# Patient Record
Sex: FEMALE | Race: WHITE | NOT HISPANIC OR LATINO | Employment: UNEMPLOYED | URBAN - METROPOLITAN AREA
[De-identification: names, ages, dates, MRNs, and addresses within clinical notes are randomized per-mention and may not be internally consistent; named-entity substitution may affect disease eponyms.]

---

## 2017-02-09 ENCOUNTER — GENERIC CONVERSION - ENCOUNTER (OUTPATIENT)
Dept: OTHER | Facility: OTHER | Age: 27
End: 2017-02-09

## 2017-02-09 ENCOUNTER — ALLSCRIPTS OFFICE VISIT (OUTPATIENT)
Dept: OTHER | Facility: OTHER | Age: 27
End: 2017-02-09

## 2017-02-09 DIAGNOSIS — M53.3 SACROCOCCYGEAL DISORDERS, NOT ELSEWHERE CLASSIFIED: ICD-10-CM

## 2017-02-10 ENCOUNTER — HOSPITAL ENCOUNTER (OUTPATIENT)
Dept: RADIOLOGY | Facility: HOSPITAL | Age: 27
Discharge: HOME | End: 2017-02-10
Payer: COMMERCIAL

## 2017-02-10 ENCOUNTER — TRANSCRIBE ORDERS (OUTPATIENT)
Dept: ADMINISTRATIVE | Facility: HOSPITAL | Age: 27
End: 2017-02-10

## 2017-02-10 DIAGNOSIS — M53.3 SACROCOCCYGEAL DISORDERS, NOT ELSEWHERE CLASSIFIED: ICD-10-CM

## 2017-02-10 PROCEDURE — 72220 X-RAY EXAM SACRUM TAILBONE: CPT

## 2017-02-10 PROCEDURE — 72100 X-RAY EXAM L-S SPINE 2/3 VWS: CPT

## 2017-02-13 ENCOUNTER — GENERIC CONVERSION - ENCOUNTER (OUTPATIENT)
Dept: OTHER | Facility: OTHER | Age: 27
End: 2017-02-13

## 2017-03-17 ENCOUNTER — HOSPITAL ENCOUNTER (OUTPATIENT)
Dept: RADIOLOGY | Facility: CLINIC | Age: 27
Discharge: HOME | End: 2017-03-17
Payer: COMMERCIAL

## 2017-03-17 ENCOUNTER — ALLSCRIPTS OFFICE VISIT (OUTPATIENT)
Dept: OTHER | Facility: OTHER | Age: 27
End: 2017-03-17

## 2017-03-17 DIAGNOSIS — S32.9XXA FRACTURE OF UNSPECIFIED PARTS OF LUMBOSACRAL SPINE AND PELVIS, INITIAL ENCOUNTER FOR CLOSED FRACTURE (HCC): ICD-10-CM

## 2017-03-17 PROCEDURE — 72170 X-RAY EXAM OF PELVIS: CPT

## 2017-06-09 ENCOUNTER — APPOINTMENT (EMERGENCY)
Dept: RADIOLOGY | Facility: HOSPITAL | Age: 27
End: 2017-06-09

## 2017-06-09 ENCOUNTER — HOSPITAL ENCOUNTER (EMERGENCY)
Facility: HOSPITAL | Age: 27
Discharge: HOME | End: 2017-06-09
Admitting: EMERGENCY MEDICINE

## 2017-06-09 VITALS
SYSTOLIC BLOOD PRESSURE: 117 MMHG | WEIGHT: 114 LBS | HEIGHT: 64 IN | OXYGEN SATURATION: 97 % | TEMPERATURE: 98.1 F | BODY MASS INDEX: 19.46 KG/M2 | HEART RATE: 121 BPM | DIASTOLIC BLOOD PRESSURE: 68 MMHG | RESPIRATION RATE: 20 BRPM

## 2017-06-09 DIAGNOSIS — M25.552 PAIN IN JOINT INVOLVING LEFT PELVIC REGION AND THIGH: ICD-10-CM
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
## 2017-06-09 PROCEDURE — 99284 EMERGENCY DEPT VISIT MOD MDM: CPT

## 2017-06-09 PROCEDURE — 96372 THER/PROPH/DIAG INJ SC/IM: CPT

## 2017-06-09 PROCEDURE — 73502 X-RAY EXAM HIP UNI 2-3 VIEWS: CPT

## 2017-06-09 RX ORDER — OXYCODONE HYDROCHLORIDE AND ACETAMINOPHEN 5; 325 MG/1; MG/1
1 TABLET ORAL EVERY 4 HOURS PRN
COMMUNITY
End: 2018-07-02

## 2017-06-09 RX ORDER — CYCLOBENZAPRINE HCL 10 MG
10 TABLET ORAL 3 TIMES DAILY PRN
Qty: 21 TABLET | Refills: 0 | Status: SHIPPED | OUTPATIENT
Start: 2017-06-09 | End: 2018-07-02

## 2017-06-09 RX ORDER — ONDANSETRON 4 MG/1
4 TABLET, ORALLY DISINTEGRATING ORAL ONCE
Status: COMPLETED | OUTPATIENT
Start: 2017-06-09 | End: 2017-06-09

## 2017-06-09 RX ADMIN — HYDROMORPHONE HYDROCHLORIDE 0.5 MG: 1 INJECTION, SOLUTION INTRAMUSCULAR; INTRAVENOUS; SUBCUTANEOUS at 16:19

## 2017-06-09 RX ADMIN — ONDANSETRON 4 MG: 4 TABLET, ORALLY DISINTEGRATING ORAL at 16:18

## 2018-01-11 NOTE — RESULT NOTES
Message   Please notify xray lumbar spine normal  Sacrum well healed  Does have a screw but is fully healed  FS     Verified Results  * XR SACRUM AND COCCYX [id number] 03:22PM Zee Cedeno Order Number: [id number]     Test Name Result Flag Reference   XR SACRUM AND COCCYX (Report)     SACRUM AND COCCYX     INDICATION: Sacral/coccygeal pain  COMPARISON: 10/27/2016     VIEWS: 3; 3 images      FINDINGS:     No orthopedic screw traverses the left sacroiliac joint  There is plate and screw fixation of the left superior pubic bone  No acute fractures  Sacral arcuate lines are maintained  The SI joints appear symmetric  Pubic symphysis maintained  IMPRESSION:     Postoperative changes  No acute abnormality         Workstation performed: [id number]     Signed by:   Nae Cerrato MD   2/13/17

## 2018-01-12 VITALS — WEIGHT: 111.13 LBS | HEIGHT: 63 IN | BODY MASS INDEX: 19.69 KG/M2

## 2018-01-12 NOTE — MISCELLANEOUS
Message  Patient refuses our care of Lovenox in order to prevent blood clots, pulmonary emboli, and death  She is aware of the this risk and continues to refuse to give herself these injections      Signatures   Electronically signed by : MILY Dumont;  Aug  5 2016 12:13PM EST                       (Author)

## 2018-01-12 NOTE — CONSULTS
Therapy  Rehabilitation Services Referral:   Patient Status: acute  Rehabilitation Services: evaluate and treat patient as needed  Precautions/Comments: Patient to have range of motion and strengthening both lower extremities, hips, knees and ankles,  Allowed to be weightbearing as tolerated left lower extremity without the cam boot  Next follow-up with Dr Eb Flores in approximate 4 weeks         Signatures   Electronically signed by : Sadi Moody [facility]; Oct  5 2016  2:08PM EST                       (Author)

## 2018-01-14 VITALS
HEART RATE: 84 BPM | TEMPERATURE: 99 F | WEIGHT: 115 LBS | SYSTOLIC BLOOD PRESSURE: 108 MMHG | HEIGHT: 63 IN | BODY MASS INDEX: 20.38 KG/M2 | RESPIRATION RATE: 16 BRPM | DIASTOLIC BLOOD PRESSURE: 60 MMHG

## 2018-01-15 NOTE — CONSULTS
Therapy  Rehabilitation Services Referral:   Diagnosis: L Pelvic, ankle injuries  Rehabilitation Services: evaluate and treat patient as needed  Precautions/Comments: Left Hip, Ankle ROM, strengthening, allowed LLE WBAT, out of Cam Boot  Frequency: 3 times per week, for 6 weeks  Please send progress report         Signatures   Electronically signed by : Miguel Spivey, [facility]; Sep 28 2016 11:56AM EST                       (Author)

## 2018-01-18 NOTE — PROCEDURES
Procedures by David Alvarez DO at  7/28/2016 10:09 PM      Author:  David Alvarez DO Service:  Trauma Author Type:  Resident    Filed:  7/28/2016 10:10 PM Date of Service:  7/28/2016 10:09 PM Status:  Attested    :  David Alvarez DO (Resident)  Cosigner:  Talha Oleary DO at 7/28/2016 10:14 PM      Procedure Orders:       1  ECG 12 lead [[id number]] ordered by David Alvarez DO at 07/28/16 2209                 Post-procedure Diagnoses:       1  Chest pain [R07 9]              Attestation signed by Talha Oleary DO at 7/28/2016 10:14 PM           I was present and supervised all critical elements of this procedure  I ensured full compliance with sterile procedure was followed                                               ECG-Preliminary Findings  Date/Time: 7/28/2016 10:09 PM  Performed by: Maicol Johns by: Rao Marmolejo     Indications / Diagnosis:  Chest pain  ECG reviewed by me,  the ED Provider: yes    Patient location:  Bedside  Previous ECG:     Previous ECG:  Unavailable  Interpretation:     Interpretation: normal    Rate:     ECG rate:  64    ECG rate assessment: normal    Rhythm:     Rhythm: sinus rhythm    Ectopy:     Ectopy: none    QRS:     QRS axis:  Normal  Conduction:     Conduction: normal    ST segments:     ST segments:  Normal  T waves:     T waves: normal                       Received for:Provider  EPIC   Jul 28 2016 10:13PM [facility] Standard Time

## 2018-07-02 ENCOUNTER — HOSPITAL ENCOUNTER (EMERGENCY)
Facility: HOSPITAL | Age: 28
Discharge: HOME | End: 2018-07-02
Attending: EMERGENCY MEDICINE | Admitting: EMERGENCY MEDICINE
Payer: COMMERCIAL

## 2018-07-02 ENCOUNTER — APPOINTMENT (EMERGENCY)
Dept: RADIOLOGY | Facility: HOSPITAL | Age: 28
End: 2018-07-02
Payer: COMMERCIAL

## 2018-07-02 VITALS
BODY MASS INDEX: 19.74 KG/M2 | WEIGHT: 115 LBS | DIASTOLIC BLOOD PRESSURE: 55 MMHG | RESPIRATION RATE: 18 BRPM | TEMPERATURE: 98.4 F | OXYGEN SATURATION: 98 % | HEART RATE: 106 BPM | SYSTOLIC BLOOD PRESSURE: 110 MMHG

## 2018-07-02 DIAGNOSIS — N73.0 PID (ACUTE PELVIC INFLAMMATORY DISEASE): Primary | ICD-10-CM

## 2018-07-02 DIAGNOSIS — N70.11 HYDROSALPINX: ICD-10-CM

## 2018-07-02 LAB
ALBUMIN SERPL BCP-MCNC: 3.8 G/DL (ref 3.5–5)
ALP SERPL-CCNC: 44 U/L (ref 46–116)
ALT SERPL W P-5'-P-CCNC: 13 U/L (ref 12–78)
ANION GAP SERPL CALCULATED.3IONS-SCNC: 9 MMOL/L (ref 4–13)
AST SERPL W P-5'-P-CCNC: 7 U/L (ref 5–45)
BACTERIA UR QL AUTO: ABNORMAL /HPF
BASOPHILS # BLD AUTO: 0.05 THOUSANDS/ΜL (ref 0–0.1)
BASOPHILS NFR BLD AUTO: 0 % (ref 0–1)
BILIRUB SERPL-MCNC: 0.4 MG/DL (ref 0.2–1)
BILIRUB UR QL STRIP: NEGATIVE
BUN SERPL-MCNC: 15 MG/DL (ref 5–25)
CALCIUM SERPL-MCNC: 8.9 MG/DL (ref 8.3–10.1)
CHLORIDE SERPL-SCNC: 100 MMOL/L (ref 100–108)
CLARITY UR: ABNORMAL
CO2 SERPL-SCNC: 26 MMOL/L (ref 21–32)
COLOR UR: YELLOW
CREAT SERPL-MCNC: 0.63 MG/DL (ref 0.6–1.3)
EOSINOPHIL # BLD AUTO: 0.1 THOUSAND/ΜL (ref 0–0.61)
EOSINOPHIL NFR BLD AUTO: 1 % (ref 0–6)
ERYTHROCYTE [DISTWIDTH] IN BLOOD BY AUTOMATED COUNT: 12.8 % (ref 11.6–15.1)
EXT PREG TEST URINE: NORMAL
GFR SERPL CREATININE-BSD FRML MDRD: 124 ML/MIN/1.73SQ M
GLUCOSE SERPL-MCNC: 99 MG/DL (ref 65–140)
GLUCOSE UR STRIP-MCNC: NEGATIVE MG/DL
HCT VFR BLD AUTO: 39.6 % (ref 34.8–46.1)
HGB BLD-MCNC: 12.9 G/DL (ref 11.5–15.4)
HGB UR QL STRIP.AUTO: ABNORMAL
IMM GRANULOCYTES # BLD AUTO: 0.12 THOUSAND/UL (ref 0–0.2)
IMM GRANULOCYTES NFR BLD AUTO: 1 % (ref 0–2)
KETONES UR STRIP-MCNC: ABNORMAL MG/DL
LEUKOCYTE ESTERASE UR QL STRIP: ABNORMAL
LIPASE SERPL-CCNC: 195 U/L (ref 73–393)
LYMPHOCYTES # BLD AUTO: 1.69 THOUSANDS/ΜL (ref 0.6–4.47)
LYMPHOCYTES NFR BLD AUTO: 8 % (ref 14–44)
MCH RBC QN AUTO: 29.5 PG (ref 26.8–34.3)
MCHC RBC AUTO-ENTMCNC: 32.6 G/DL (ref 31.4–37.4)
MCV RBC AUTO: 90 FL (ref 82–98)
MONOCYTES # BLD AUTO: 1.23 THOUSAND/ΜL (ref 0.17–1.22)
MONOCYTES NFR BLD AUTO: 6 % (ref 4–12)
MUCOUS THREADS UR QL AUTO: ABNORMAL
NEUTROPHILS # BLD AUTO: 17.81 THOUSANDS/ΜL (ref 1.85–7.62)
NEUTS SEG NFR BLD AUTO: 84 % (ref 43–75)
NITRITE UR QL STRIP: NEGATIVE
NON-SQ EPI CELLS URNS QL MICRO: ABNORMAL /HPF
NRBC BLD AUTO-RTO: 0 /100 WBCS
PH UR STRIP.AUTO: 7 [PH] (ref 5–9)
PLATELET # BLD AUTO: 295 THOUSANDS/UL (ref 149–390)
PMV BLD AUTO: 10.3 FL (ref 8.9–12.7)
POTASSIUM SERPL-SCNC: 3.5 MMOL/L (ref 3.5–5.3)
PROT SERPL-MCNC: 7.2 G/DL (ref 6.4–8.2)
PROT UR STRIP-MCNC: ABNORMAL MG/DL
RBC # BLD AUTO: 4.38 MILLION/UL (ref 3.81–5.12)
RBC #/AREA URNS AUTO: ABNORMAL /HPF
SODIUM SERPL-SCNC: 135 MMOL/L (ref 136–145)
SP GR UR STRIP.AUTO: 1.02 (ref 1–1.03)
UROBILINOGEN UR QL STRIP.AUTO: 1 E.U./DL
WBC # BLD AUTO: 21 THOUSAND/UL (ref 4.31–10.16)
WBC #/AREA URNS AUTO: ABNORMAL /HPF

## 2018-07-02 PROCEDURE — 80053 COMPREHEN METABOLIC PANEL: CPT | Performed by: EMERGENCY MEDICINE

## 2018-07-02 PROCEDURE — 36415 COLL VENOUS BLD VENIPUNCTURE: CPT | Performed by: EMERGENCY MEDICINE

## 2018-07-02 PROCEDURE — 87591 N.GONORRHOEAE DNA AMP PROB: CPT | Performed by: EMERGENCY MEDICINE

## 2018-07-02 PROCEDURE — 81001 URINALYSIS AUTO W/SCOPE: CPT | Performed by: EMERGENCY MEDICINE

## 2018-07-02 PROCEDURE — 87491 CHLMYD TRACH DNA AMP PROBE: CPT | Performed by: EMERGENCY MEDICINE

## 2018-07-02 PROCEDURE — 87086 URINE CULTURE/COLONY COUNT: CPT | Performed by: EMERGENCY MEDICINE

## 2018-07-02 PROCEDURE — 83690 ASSAY OF LIPASE: CPT | Performed by: EMERGENCY MEDICINE

## 2018-07-02 PROCEDURE — 96361 HYDRATE IV INFUSION ADD-ON: CPT

## 2018-07-02 PROCEDURE — 96365 THER/PROPH/DIAG IV INF INIT: CPT

## 2018-07-02 PROCEDURE — 96375 TX/PRO/DX INJ NEW DRUG ADDON: CPT

## 2018-07-02 PROCEDURE — 74177 CT ABD & PELVIS W/CONTRAST: CPT

## 2018-07-02 PROCEDURE — 81025 URINE PREGNANCY TEST: CPT | Performed by: EMERGENCY MEDICINE

## 2018-07-02 PROCEDURE — 99284 EMERGENCY DEPT VISIT MOD MDM: CPT

## 2018-07-02 PROCEDURE — 87070 CULTURE OTHR SPECIMN AEROBIC: CPT | Performed by: EMERGENCY MEDICINE

## 2018-07-02 PROCEDURE — 85025 COMPLETE CBC W/AUTO DIFF WBC: CPT | Performed by: EMERGENCY MEDICINE

## 2018-07-02 PROCEDURE — 87147 CULTURE TYPE IMMUNOLOGIC: CPT | Performed by: EMERGENCY MEDICINE

## 2018-07-02 RX ORDER — DOXYCYCLINE HYCLATE 100 MG/1
100 CAPSULE ORAL 2 TIMES DAILY
Qty: 28 CAPSULE | Refills: 0 | Status: SHIPPED | OUTPATIENT
Start: 2018-07-02 | End: 2018-07-16

## 2018-07-02 RX ORDER — KETOROLAC TROMETHAMINE 30 MG/ML
15 INJECTION, SOLUTION INTRAMUSCULAR; INTRAVENOUS ONCE
Status: COMPLETED | OUTPATIENT
Start: 2018-07-02 | End: 2018-07-02

## 2018-07-02 RX ORDER — SERTRALINE HYDROCHLORIDE 100 MG/1
75 TABLET, FILM COATED ORAL DAILY
COMMUNITY

## 2018-07-02 RX ORDER — DOXYCYCLINE HYCLATE 100 MG/1
100 CAPSULE ORAL ONCE
Status: COMPLETED | OUTPATIENT
Start: 2018-07-02 | End: 2018-07-02

## 2018-07-02 RX ORDER — HYDROXYZINE PAMOATE 100 MG/1
100 CAPSULE ORAL 3 TIMES DAILY PRN
COMMUNITY

## 2018-07-02 RX ORDER — ACETAMINOPHEN 325 MG/1
650 TABLET ORAL ONCE
Status: COMPLETED | OUTPATIENT
Start: 2018-07-02 | End: 2018-07-02

## 2018-07-02 RX ADMIN — ACETAMINOPHEN 650 MG: 325 TABLET ORAL at 18:50

## 2018-07-02 RX ADMIN — DOXYCYCLINE 100 MG: 100 CAPSULE ORAL at 20:05

## 2018-07-02 RX ADMIN — IOHEXOL 100 ML: 350 INJECTION, SOLUTION INTRAVENOUS at 19:21

## 2018-07-02 RX ADMIN — CEFTRIAXONE 1000 MG: 1 INJECTION, SOLUTION INTRAVENOUS at 20:05

## 2018-07-02 RX ADMIN — KETOROLAC TROMETHAMINE 15 MG: 30 INJECTION, SOLUTION INTRAMUSCULAR at 18:50

## 2018-07-02 RX ADMIN — SODIUM CHLORIDE 1000 ML: 0.9 INJECTION, SOLUTION INTRAVENOUS at 18:46

## 2018-07-03 NOTE — ED PROVIDER NOTES
History  Chief Complaint   Patient presents with    Abdominal Pain     left lower abdominal pain since yesterday  no other s/s     Patient presents for evaluation of abdominal pain  Pain started yesterday LLQ associated with fever  No N/V/D  Patient denies vaginal discharge or pain  LMP 6/10/2018  No apparent modifying factors for the pain  History provided by:  Patient   used: No    Abdominal Pain   Associated symptoms: fever    Associated symptoms: no chest pain, no constipation, no diarrhea, no dysuria, no nausea, no shortness of breath, no vaginal bleeding, no vaginal discharge and no vomiting        Prior to Admission Medications   Prescriptions Last Dose Informant Patient Reported? Taking?   hydrOXYzine (VISTARIL) 100 MG capsule Past Month at Unknown time  Yes Yes   Sig: Take 100 mg by mouth 3 (three) times a day as needed for itching   sertraline (ZOLOFT) 100 mg tablet Past Month at Unknown time  Yes Yes   Sig: Take 75 mg by mouth daily      Facility-Administered Medications: None       Past Medical History:   Diagnosis Date    Psychiatric disorder        Past Surgical History:   Procedure Laterality Date    ABDOMINAL SURGERY      COLPOSCOPY      ORIF HIP FRACTURE Left 7/28/2016    Procedure: OPEN REDUCTION W/ INTERNAL FIXATION (ORIF) HIP WITH CANNUALATED SCREWS left sacroiliac joint, left superior pubic ramus;  Surgeon: Pb Hodgson MD;  Location: [facility];  Service:     WRIST SURGERY Left     for removal blood clot 1998       History reviewed  No pertinent family history  I have reviewed and agree with the history as documented  Social History   Substance Use Topics    Smoking status: Current Every Day Smoker     Packs/day: 0 25    Smokeless tobacco: Never Used    Alcohol use No        Review of Systems   Constitutional: Positive for fever  Respiratory: Negative for shortness of breath  Cardiovascular: Negative for chest pain     Gastrointestinal: Positive for abdominal pain  Negative for constipation, diarrhea, nausea and vomiting  Genitourinary: Negative for dysuria, flank pain, vaginal bleeding, vaginal discharge and vaginal pain  All other systems reviewed and are negative  Physical Exam  Physical Exam   Constitutional: She is oriented to person, place, and time  No distress  HENT:   Mouth/Throat: Oropharynx is clear and moist    Eyes: Pupils are equal, round, and reactive to light  Neck: Normal range of motion  Cardiovascular: Normal rate, regular rhythm and intact distal pulses  Pulmonary/Chest: Effort normal and breath sounds normal  No respiratory distress  Abdominal: Soft  Bowel sounds are normal  She exhibits no distension  There is tenderness  There is no rebound and no guarding  LLQ/suprapubic tenderness   Genitourinary: There is no rash or lesion on the right labia  There is no rash or lesion on the left labia  Cervix exhibits motion tenderness and discharge  Left adnexum displays tenderness  Musculoskeletal: Normal range of motion  Neurological: She is alert and oriented to person, place, and time  Skin: Capillary refill takes less than 2 seconds  She is not diaphoretic  Nursing note and vitals reviewed        Vital Signs  ED Triage Vitals   Temperature Pulse Respirations Blood Pressure SpO2   07/02/18 1742 07/02/18 1742 07/02/18 1742 07/02/18 1742 07/02/18 1742   (!) 101 7 °F (38 7 °C) (!) 112 18 145/71 99 %      Temp Source Heart Rate Source Patient Position - Orthostatic VS BP Location FiO2 (%)   07/02/18 1952 07/02/18 1952 07/02/18 1952 07/02/18 1952 --   Oral Monitor Lying Left arm       Pain Score       07/02/18 1742       6           Vitals:    07/02/18 1742 07/02/18 1952   BP: 145/71 110/55   Pulse: (!) 112 (!) 106   Patient Position - Orthostatic VS:  Lying       Visual Acuity      ED Medications  Medications   sodium chloride 0 9 % bolus 1,000 mL (0 mL Intravenous Stopped 7/2/18 1951)   acetaminophen (TYLENOL) tablet 650 mg (650 mg Oral Given 7/2/18 1850)   ketorolac (TORADOL) injection 15 mg (15 mg Intravenous Given 7/2/18 1850)   iohexol (OMNIPAQUE) 350 MG/ML injection (MULTI-DOSE) 100 mL (100 mL Intravenous Given 7/2/18 1921)   cefTRIAXone (ROCEPHIN) IVPB (premix) 1,000 mg (0 mg Intravenous Stopped 7/2/18 2035)   doxycycline hyclate (VIBRAMYCIN) capsule 100 mg (100 mg Oral Given 7/2/18 2005)       Diagnostic Studies  Results Reviewed     Procedure Component Value Units Date/Time    Chlamydia/GC amplified DNA by PCR [[id number]] Collected:  07/02/18 2002    Lab Status: In process Specimen:  Genital from Cervix Updated:  07/02/18 2006    Genital Comprehensive Culture [[id number]] Collected:  07/02/18 2002    Lab Status: In process Specimen:  Genital from Cervix Updated:  07/02/18 2006    Lipase [[id number]]  (Normal) Collected:  07/02/18 1844    Lab Status:  Final result Specimen:  Blood from Arm, Left Updated:  07/02/18 1907     Lipase 195 u/L     Comprehensive metabolic panel [[id number]]  (Abnormal) Collected:  07/02/18 1844    Lab Status:  Final result Specimen:  Blood from Arm, Left Updated:  07/02/18 1907     Sodium 135 (L) mmol/L      Potassium 3 5 mmol/L      Chloride 100 mmol/L      CO2 26 mmol/L      Anion Gap 9 mmol/L      BUN 15 mg/dL      Creatinine 0 63 mg/dL      Glucose 99 mg/dL      Calcium 8 9 mg/dL      AST 7 U/L      ALT 13 U/L      Alkaline Phosphatase 44 (L) U/L      Total Protein 7 2 g/dL      Albumin 3 8 g/dL      Total Bilirubin 0 40 mg/dL      eGFR 124 ml/min/1 73sq m     Narrative:         National Kidney Disease Education Program recommendations are as follows:  GFR calculation is accurate only with a steady state creatinine  Chronic Kidney disease less than 60 ml/min/1 73 sq  meters  Kidney failure less than 15 ml/min/1 73 sq  meters      CBC and differential [[id number]]  (Abnormal) Collected:  07/02/18 1844    Lab Status:  Final result Specimen:  Blood from Arm, Left Updated:  07/02/18 1852     WBC 21 00 (H) Thousand/uL      RBC 4 38 Million/uL      Hemoglobin 12 9 g/dL      Hematocrit 39 6 %      MCV 90 fL      MCH 29 5 pg      MCHC 32 6 g/dL      RDW 12 8 %      MPV 10 3 fL      Platelets 453 Thousands/uL      nRBC 0 /100 WBCs      Neutrophils Relative 84 (H) %      Immat GRANS % 1 %      Lymphocytes Relative 8 (L) %      Monocytes Relative 6 %      Eosinophils Relative 1 %      Basophils Relative 0 %      Neutrophils Absolute 17 81 (H) Thousands/µL      Immature Grans Absolute 0 12 Thousand/uL      Lymphocytes Absolute 1 69 Thousands/µL      Monocytes Absolute 1 23 (H) Thousand/µL      Eosinophils Absolute 0 10 Thousand/µL      Basophils Absolute 0 05 Thousands/µL     Urine Microscopic [[id number]]  (Abnormal) Collected:  07/02/18 1807    Lab Status:  Final result Specimen:  Urine from Urine, Clean Catch Updated:  07/02/18 1823     RBC, UA 1-2 (A) /hpf      WBC, UA 20-30 (A) /hpf      Epithelial Cells Moderate (A) /hpf      Bacteria, UA Occasional /hpf      MUCOUS THREADS Moderate (A)    UA w Reflex to Microscopic [[id number]]  (Abnormal) Collected:  07/02/18 1807    Lab Status:  Final result Specimen:  Urine from Urine, Clean Catch Updated:  07/02/18 1816     Color, UA Yellow     Clarity, UA Slightly Cloudy     Specific Gravity, UA 1 025     pH, UA 7 0     Leukocytes, UA Small (A)     Nitrite, UA Negative     Protein, UA Trace (A) mg/dl      Glucose, UA Negative mg/dl      Ketones, UA Trace (A) mg/dl      Urobilinogen, UA 1 0 E U /dl      Bilirubin, UA Negative     Blood, UA Trace-lysed (A)    Urine culture [[id number]] Collected:  07/02/18 1807    Lab Status:   In process Specimen:  Urine from Urine, Clean Catch Updated:  07/02/18 1812    POCT pregnancy, urine [[id number]]  (Normal) Resulted:  07/02/18 1805    Lab Status:  Final result Updated:  07/02/18 1806     EXT PREG TEST UR (Ref: Negative) negative (-)                 CT abdomen pelvis with contrast   Final Result by Adelita Nj MD (07/02 1936)      Tubular left adnexal structure suggesting hydrosalpinx  Remainder of the abdomen and pelvis is unremarkable  Workstation performed: [id number]                    Procedures  Procedures       Phone Contacts  ED Phone Contact    ED Course                               MDM  Number of Diagnoses or Management Options  Hydrosalpinx:   PID (acute pelvic inflammatory disease):   Diagnosis management comments: Pulse ox 98% on RA indicating adequate oxygenation    Discussed CT results with patient  Likely hydrosalpinx secondary to PID given the WBC and fever  Gave dose of IV abx and will discharge on Doxycycline with GYN follow up  Patient does have a history of PID  Advised to refrain from sex until cleared by MD         Amount and/or Complexity of Data Reviewed  Clinical lab tests: ordered and reviewed  Tests in the radiology section of CPT®: ordered and reviewed    Patient Progress  Patient progress: stable    CritCare Time    Disposition  Final diagnoses:   PID (acute pelvic inflammatory disease)   Hydrosalpinx     Time reflects when diagnosis was documented in both MDM as applicable and the Disposition within this note     Time User Action Codes Description Comment    7/2/2018  8:31 PM Autumn Molina Add [N73 0] PID (acute pelvic inflammatory disease)     7/2/2018  8:32 PM Zion Barbosa Add [N70 11] Hydrosalpinx       ED Disposition     ED Disposition Condition Comment    Discharge  [address] discharge to home/self care      Condition at discharge: stable        Follow-up Information     Follow up With Specialties Details Why Contact Info Additional 8914 Haroon Chan MD Obstetrics and Gynecology, Obstetrics, Gynecology In 3 days or OB/GYN or your choice [address] [place] [address]  [address] [place] [address]  [phone]       [address] [facility] Emergency Medicine  If symptoms worsen [address] [place] [address]  [phone] [facility] [place], [place], [address]          Patient's Medications   Discharge Prescriptions    DOXYCYCLINE HYCLATE (VIBRAMYCIN) 100 MG CAPSULE    Take 1 capsule (100 mg total) by mouth 2 (two) times a day for 14 days       Start Date: 7/2/2018  End Date: 7/16/2018       Order Dose: 100 mg       Quantity: 28 capsule    Refills: 0     No discharge procedures on file      ED Provider  Electronically Signed by           Mary Taylor DO  07/02/18 3117

## 2018-07-03 NOTE — DISCHARGE INSTRUCTIONS
Pelvic Inflammatory Disease   WHAT YOU NEED TO KNOW:   Pelvic inflammatory disease (PID) is a condition that causes your reproductive organs to become inflamed  Your reproductive organs include your ovaries, fallopian tubes, uterus, cervix (lower area of your uterus), and vagina  PID may cause chronic (long-term) abdominal pain and problems with future pregnancies  You may have no symptoms of PID  DISCHARGE INSTRUCTIONS:   Seek care immediately if:   · You have chills or a high fever  · You have pain in your upper right abdomen  · You have pain in your lower abdomen that does not go away with rest or medicine  · Your symptoms get worse or do not improve after 3 days of treatment  Contact your healthcare provider if:   · You have nausea or are vomiting  · Your skin is red, itchy, or you have a new rash  · You think or know you are pregnant  · You have questions or concerns about your condition or care  Medicines:   · Medicines  may be given to prevent or fight a bacterial infection or to reduce pain  Ask your healthcare provider how to take pain medicine safely  · Take your medicine as directed  Contact your healthcare provider if you think your medicine is not helping or if you have side effects  Tell him or her if you are allergic to any medicine  Keep a list of the medicines, vitamins, and herbs you take  Include the amounts, and when and why you take them  Bring the list or the pill bottles to follow-up visits  Carry your medicine list with you in case of an emergency  Manage PID:   · Finish your treatment  If you do not finish your treatment for PID, your infection may not go away  You may also have an increased risk for another STI in the future  · Do not have sex until your healthcare provider says it is okay  You will need to finish treatment before it is safe to have sex  · Talk to your sex partners  If you have an STI, tell your recent partners   Tell them to see a healthcare provider for testing and treatment  This will help stop the spread of infection to others or back to you  Decrease your risk for PID:   · Do not have unprotected sex  Always use a latex condom  Do not have sex while you or your partners are being treated for an STI  · Get tested for STIs  before and after new sex partners  Talk to your partner and ask them to get tested  · Do not delay treatment for STIs or vaginal infections  Talk to your healthcare provider if you think you have an STI  Early treatment can prevent health problems that may lead to PID  Follow up with your healthcare provider as directed: You may need a follow up visit a few days after you start your treatment  Your healthcare provider may ask you if your recent sexual partners have also been treated for an STI  You may need more tests if your symptoms do not go away or worsen after treatment  Your treatment may need to be changed if your symptoms are not getting better  Write down your questions so you remember to ask them during your visits  © 2017 [facility] Information is for End User's use only and may not be sold, redistributed or otherwise used for commercial purposes  All illustrations and images included in CareNotes® are the copyrighted property of [employer] , [employer]  or [employer]andro Skaggs  The above information is an  only  It is not intended as medical advice for individual conditions or treatments  Talk to your doctor, nurse or pharmacist before following any medical regimen to see if it is safe and effective for you

## 2018-07-04 LAB — BACTERIA UR CULT: NORMAL

## 2018-07-05 LAB
BACTERIA GENITAL AEROBE CULT: ABNORMAL
BACTERIA GENITAL AEROBE CULT: ABNORMAL

## 2018-07-06 LAB
CHLAMYDIA DNA CVX QL NAA+PROBE: NORMAL
N GONORRHOEA DNA GENITAL QL NAA+PROBE: NORMAL

## 2019-06-26 ENCOUNTER — OFFICE VISIT (OUTPATIENT)
Dept: URGENT CARE | Facility: CLINIC | Age: 29
End: 2019-06-26
Payer: COMMERCIAL

## 2019-06-26 VITALS
HEART RATE: 108 BPM | OXYGEN SATURATION: 98 % | TEMPERATURE: 98 F | BODY MASS INDEX: 20.79 KG/M2 | WEIGHT: 121.8 LBS | DIASTOLIC BLOOD PRESSURE: 67 MMHG | RESPIRATION RATE: 16 BRPM | HEIGHT: 64 IN | SYSTOLIC BLOOD PRESSURE: 114 MMHG

## 2019-06-26 DIAGNOSIS — R21 RASH: Primary | ICD-10-CM

## 2019-06-26 PROCEDURE — 99213 OFFICE O/P EST LOW 20 MIN: CPT | Performed by: PHYSICIAN ASSISTANT

## 2019-06-26 RX ORDER — PREDNISONE 50 MG/1
50 TABLET ORAL DAILY
Qty: 5 TABLET | Refills: 0 | Status: SHIPPED | OUTPATIENT
Start: 2019-06-26 | End: 2019-07-01

## 2019-06-26 RX ORDER — CLINDAMYCIN HYDROCHLORIDE 300 MG/1
300 CAPSULE ORAL 3 TIMES DAILY
Qty: 21 CAPSULE | Refills: 0 | Status: SHIPPED | OUTPATIENT
Start: 2019-06-26 | End: 2019-07-03

## 2019-11-06 ENCOUNTER — HOSPITAL ENCOUNTER (EMERGENCY)
Facility: HOSPITAL | Age: 29
Discharge: HOME | End: 2019-11-07
Attending: EMERGENCY MEDICINE
Payer: COMMERCIAL

## 2019-11-06 ENCOUNTER — APPOINTMENT (EMERGENCY)
Dept: RADIOLOGY | Facility: HOSPITAL | Age: 29
End: 2019-11-06
Payer: COMMERCIAL

## 2019-11-06 VITALS
DIASTOLIC BLOOD PRESSURE: 67 MMHG | OXYGEN SATURATION: 100 % | SYSTOLIC BLOOD PRESSURE: 112 MMHG | TEMPERATURE: 99.3 F | RESPIRATION RATE: 18 BRPM | HEART RATE: 67 BPM

## 2019-11-06 DIAGNOSIS — N94.89 PELVIC CONGESTION SYNDROME: Primary | ICD-10-CM

## 2019-11-06 DIAGNOSIS — R10.32 LEFT GROIN PAIN: ICD-10-CM

## 2019-11-06 LAB
ALBUMIN SERPL BCP-MCNC: 4.2 G/DL (ref 3.5–5)
ALP SERPL-CCNC: 42 U/L (ref 46–116)
ALT SERPL W P-5'-P-CCNC: 13 U/L (ref 12–78)
ANION GAP SERPL CALCULATED.3IONS-SCNC: 9 MMOL/L (ref 4–13)
AST SERPL W P-5'-P-CCNC: 13 U/L (ref 5–45)
BACTERIA UR QL AUTO: ABNORMAL /HPF
BASOPHILS # BLD AUTO: 0.04 THOUSANDS/ΜL (ref 0–0.1)
BASOPHILS NFR BLD AUTO: 0 % (ref 0–1)
BILIRUB SERPL-MCNC: 0.2 MG/DL (ref 0.2–1)
BILIRUB UR QL STRIP: NEGATIVE
BUN SERPL-MCNC: 19 MG/DL (ref 5–25)
CALCIUM SERPL-MCNC: 9.2 MG/DL (ref 8.3–10.1)
CHLORIDE SERPL-SCNC: 102 MMOL/L (ref 100–108)
CLARITY UR: CLEAR
CO2 SERPL-SCNC: 29 MMOL/L (ref 21–32)
COLOR UR: YELLOW
CREAT SERPL-MCNC: 0.78 MG/DL (ref 0.6–1.3)
EOSINOPHIL # BLD AUTO: 0.19 THOUSAND/ΜL (ref 0–0.61)
EOSINOPHIL NFR BLD AUTO: 2 % (ref 0–6)
ERYTHROCYTE [DISTWIDTH] IN BLOOD BY AUTOMATED COUNT: 11.9 % (ref 11.6–15.1)
EXT PREG TEST URINE: NEGATIVE
EXT. CONTROL ED NAV: NORMAL
GFR SERPL CREATININE-BSD FRML MDRD: 104 ML/MIN/1.73SQ M
GLUCOSE SERPL-MCNC: 97 MG/DL (ref 65–140)
GLUCOSE UR STRIP-MCNC: NEGATIVE MG/DL
HCT VFR BLD AUTO: 43 % (ref 34.8–46.1)
HGB BLD-MCNC: 13.9 G/DL (ref 11.5–15.4)
HGB UR QL STRIP.AUTO: ABNORMAL
IMM GRANULOCYTES # BLD AUTO: 0.03 THOUSAND/UL (ref 0–0.2)
IMM GRANULOCYTES NFR BLD AUTO: 0 % (ref 0–2)
KETONES UR STRIP-MCNC: NEGATIVE MG/DL
LEUKOCYTE ESTERASE UR QL STRIP: NEGATIVE
LYMPHOCYTES # BLD AUTO: 2.73 THOUSANDS/ΜL (ref 0.6–4.47)
LYMPHOCYTES NFR BLD AUTO: 25 % (ref 14–44)
MCH RBC QN AUTO: 29.7 PG (ref 26.8–34.3)
MCHC RBC AUTO-ENTMCNC: 32.3 G/DL (ref 31.4–37.4)
MCV RBC AUTO: 92 FL (ref 82–98)
MONOCYTES # BLD AUTO: 0.8 THOUSAND/ΜL (ref 0.17–1.22)
MONOCYTES NFR BLD AUTO: 7 % (ref 4–12)
MUCOUS THREADS UR QL AUTO: ABNORMAL
NEUTROPHILS # BLD AUTO: 7.12 THOUSANDS/ΜL (ref 1.85–7.62)
NEUTS SEG NFR BLD AUTO: 66 % (ref 43–75)
NITRITE UR QL STRIP: NEGATIVE
NON-SQ EPI CELLS URNS QL MICRO: ABNORMAL /HPF
NRBC BLD AUTO-RTO: 0 /100 WBCS
PH UR STRIP.AUTO: 6 [PH]
PLATELET # BLD AUTO: 296 THOUSANDS/UL (ref 149–390)
PMV BLD AUTO: 10.6 FL (ref 8.9–12.7)
POTASSIUM SERPL-SCNC: 4 MMOL/L (ref 3.5–5.3)
PROT SERPL-MCNC: 8 G/DL (ref 6.4–8.2)
PROT UR STRIP-MCNC: NEGATIVE MG/DL
RBC # BLD AUTO: 4.68 MILLION/UL (ref 3.81–5.12)
RBC #/AREA URNS AUTO: ABNORMAL /HPF
SODIUM SERPL-SCNC: 140 MMOL/L (ref 136–145)
SP GR UR STRIP.AUTO: 1.02 (ref 1–1.03)
UROBILINOGEN UR QL STRIP.AUTO: 0.2 E.U./DL
WBC # BLD AUTO: 10.91 THOUSAND/UL (ref 4.31–10.16)
WBC #/AREA URNS AUTO: ABNORMAL /HPF

## 2019-11-06 PROCEDURE — 85025 COMPLETE CBC W/AUTO DIFF WBC: CPT | Performed by: EMERGENCY MEDICINE

## 2019-11-06 PROCEDURE — 81001 URINALYSIS AUTO W/SCOPE: CPT | Performed by: EMERGENCY MEDICINE

## 2019-11-06 PROCEDURE — 80053 COMPREHEN METABOLIC PANEL: CPT | Performed by: EMERGENCY MEDICINE

## 2019-11-06 PROCEDURE — 96374 THER/PROPH/DIAG INJ IV PUSH: CPT

## 2019-11-06 PROCEDURE — 99284 EMERGENCY DEPT VISIT MOD MDM: CPT

## 2019-11-06 PROCEDURE — 96375 TX/PRO/DX INJ NEW DRUG ADDON: CPT

## 2019-11-06 PROCEDURE — 72193 CT PELVIS W/DYE: CPT

## 2019-11-06 PROCEDURE — 81025 URINE PREGNANCY TEST: CPT | Performed by: EMERGENCY MEDICINE

## 2019-11-06 PROCEDURE — 36415 COLL VENOUS BLD VENIPUNCTURE: CPT | Performed by: EMERGENCY MEDICINE

## 2019-11-06 RX ORDER — KETOROLAC TROMETHAMINE 30 MG/ML
15 INJECTION, SOLUTION INTRAMUSCULAR; INTRAVENOUS ONCE
Status: COMPLETED | OUTPATIENT
Start: 2019-11-06 | End: 2019-11-06

## 2019-11-06 RX ORDER — TRAMADOL HYDROCHLORIDE 50 MG/1
50 TABLET ORAL EVERY 6 HOURS PRN
Qty: 12 TABLET | Refills: 0 | Status: SHIPPED | OUTPATIENT
Start: 2019-11-06 | End: 2019-11-16

## 2019-11-06 RX ORDER — MORPHINE SULFATE 4 MG/ML
4 INJECTION, SOLUTION INTRAMUSCULAR; INTRAVENOUS ONCE
Status: COMPLETED | OUTPATIENT
Start: 2019-11-06 | End: 2019-11-06

## 2019-11-06 RX ORDER — ONDANSETRON 2 MG/ML
4 INJECTION INTRAMUSCULAR; INTRAVENOUS ONCE
Status: COMPLETED | OUTPATIENT
Start: 2019-11-06 | End: 2019-11-06

## 2019-11-06 RX ADMIN — MORPHINE SULFATE 4 MG: 4 INJECTION INTRAVENOUS at 22:10

## 2019-11-06 RX ADMIN — KETOROLAC TROMETHAMINE 15 MG: 30 INJECTION, SOLUTION INTRAMUSCULAR at 23:48

## 2019-11-06 RX ADMIN — IOHEXOL 100 ML: 350 INJECTION, SOLUTION INTRAVENOUS at 22:35

## 2019-11-06 RX ADMIN — ONDANSETRON 4 MG: 2 INJECTION INTRAMUSCULAR; INTRAVENOUS at 22:09
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
## 2019-11-07 NOTE — DISCHARGE INSTRUCTIONS
Follow up with your OB/Gyn or Dr Lili Cooney in 1-2 days  Follow up with your primary care physician and your orthopedic surgeon in 1-2 days  Return to the ER for further concerns  Take medication as prescribed
upper

## 2021-03-17 ENCOUNTER — NURSE TRIAGE (OUTPATIENT)
Dept: OTHER | Facility: OTHER | Age: 31
End: 2021-03-17

## 2021-03-17 DIAGNOSIS — Z20.822 EXPOSURE TO COVID-19 VIRUS: Primary | ICD-10-CM

## 2021-03-17 DIAGNOSIS — Z20.822 EXPOSURE TO COVID-19 VIRUS: ICD-10-CM

## 2021-03-17 PROCEDURE — U0005 INFEC AGEN DETEC AMPLI PROBE: HCPCS | Performed by: FAMILY MEDICINE

## 2021-03-17 PROCEDURE — U0003 INFECTIOUS AGENT DETECTION BY NUCLEIC ACID (DNA OR RNA); SEVERE ACUTE RESPIRATORY SYNDROME CORONAVIRUS 2 (SARS-COV-2) (CORONAVIRUS DISEASE [COVID-19]), AMPLIFIED PROBE TECHNIQUE, MAKING USE OF HIGH THROUGHPUT TECHNOLOGIES AS DESCRIBED BY CMS-2020-01-R: HCPCS | Performed by: FAMILY MEDICINE

## 2021-03-17 NOTE — TELEPHONE ENCOUNTER
Pt is requesting a covid test and does not have a [facility]'s PCP  Reports having an out of network PCP  Order placed  Pt informed of closest testing site and was advised of hours of operation, address, to wear a mask, and to stay in the car  Pt verbalized understanding  Pt already has a [employer] account to check for results  Advised to begin checking in 2-3 days after testing is completed  Reason for Disposition   [1] COVID-19 infection suspected by caller or triager AND [2] mild symptoms (cough, fever, or others) AND [6] no complications or SOB    Answer Assessment - Initial Assessment Questions  Were you within 6 feet or less, for up to 15 minutes or more with a person that has a confirmed COVID-19 test? Yes     What was the date of your exposure? Son tested positive, living together     Are you experiencing any symptoms attributed to the virus?  (Assess for SOB, cough, fever, difficulty breathing) headache, sore throat, fatigue, body aches     HIGH RISK: Do you have any history heart or lung conditions, weakened immune system, diabetes, Asthma, CHF, HIV, COPD, Chemo, renal failure, sickle cell, etc? Denies     PREGNANCY: Are you pregnant or did you recently give birth?  Denies    Protocols used: CORONAVIRUS (COVID-19) DIAGNOSED OR SUSPECTED-ADULT-
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
## 2021-03-18 LAB — SARS-COV-2 RNA RESP QL NAA+PROBE: NEGATIVE
